# Patient Record
Sex: FEMALE | Race: WHITE
[De-identification: names, ages, dates, MRNs, and addresses within clinical notes are randomized per-mention and may not be internally consistent; named-entity substitution may affect disease eponyms.]

---

## 2017-02-01 NOTE — DIAGNOSTIC IMAGING REPORT
PROCEDURE: CT urinary tract, rule out kidney stone.



TECHNIQUE: Multiple contiguous axial images were obtained

through the abdomen and pelvis without the use of intravenous

contrast.



INDICATION: Bilateral lower abdominal pain.



FINDINGS: The lung bases appear clear. The liver, spleen,

pancreas and adrenal glands appear unremarkable for an

unenhanced exam. Hypodensity near the gallbladder is probably an

artifact from adjacent duodenum with no definite calcified

stone.



The kidneys demonstrate no hydronephrosis. No urinary tract

stones. The abdominal aorta is normal in caliber. No para-aortic

significantly enlarged lymph nodes are seen. The uterus and

adnexa appear grossly unremarkable. There is no significant free

fluid or fluid collection in the abdomen or pelvis seen. There

is no bowel obstruction. Small to moderate amount of fecal

material is seen in the colon. The cecum projects into the right

side of the pelvis. The appendix is not seen. The osseous

structures appear grossly unremarkable.



IMPRESSION: No urinary tract stones or hydronephrosis.



Dictated by:



Dictated on workstation # [id number]

## 2017-02-01 NOTE — ED GU-FEMALE
General


Chief Complaint:  Back Problems


Stated Complaint:  POSSIBLE KIDNEY STONES


Nursing Triage Note:  


STATES YESTERDAY SHE HAD BLOOD IN HER URINE.  TODAY THINKS SHE PASSED A KIDNEY 


STONE WHILE IN THE SHOWER.  COMPLAINS OF SEVERE LOWER BACK PAIN.  TYLENOL 100MG 


TAKEN AT 1100.


Nursing Sepsis Screen:  No Definite Risk


Source:  patient


Exam Limitations:  no limitations





History of Present Illness


Time seen by provider:  15:50


Initial Comments


Here with report of pain to the left flank and blood in her urine.  She noted 

that she had a stone like object pass when she was urinating in the shower.  

She does complain of fever this morning and chills.  She did take Tylenol 1000 

mg at 11 a.m. and that seemed to help call now.  Pain has persisted.  She has 

nausea.  Reports pain is sharp and persistent to the left low back.


Timing/Duration:  getting worse


Severity/Quality:  moderate, severe, sharp


Location:  left flank


Radiation:  none


Activities at Onset:  none


Sexual Utting History:  not active (last 1 year ago.)


Modifying Factors:  Worsens With Urinating


Associated Symptoms:   abdominal pain dysuria fever/chills lower back pain 

nausea/vomiting urinary frequency





Allergies and Home Medications


Allergies


Coded Allergies:  


     No Known Drug Allergies (Unverified , 17)





Home Medications


  (Reported) 


Epinephrine 0.3 Mg/0.3 Ml Auto.injct 0.3 MG IJ (Reported) 





Constitutional:   see HPI chills feverNo weakness


EENTM:   no symptoms reported


Respiratory:   no symptoms reportedNo cough, No short of breath


Cardiovascular:   no symptoms reportedNo chest pain, No palpitations


Gastrointestinal:   abdominal pain nauseaNo vomiting


Genitourinary:   dysuria pain urgency


Musculoskeletal:   back painNo muscle cramps


Skin:   no symptoms reported


Psychiatric/Neurological:   No Symptoms Reported


Endocrine:   No Symptoms Reported


All Other Systemes Reviewed


Negative Unless Noted:  Yes





Past Medical-Social-Family Hx


Patient Social History


Alcohol Use:  Occasionally Uses


Recreational Drug Use:  No


Smoking Status:  Never a Smoker


Recent Foreign Travel:  No


Contact w/Someone Who Travel:  No


Recent Infectious Disease Expo:  No


Recent Hopitalizations:  No





Surgeries


HX Surgeries:  Yes (ESOPHOGEAL BX)


Surgeries:  Orthopedic





Respiratory


Hx Respiratory Disorders:  No





Cardiovascular


Hx Cardiac Disorders:  No





Neurological


Hx Neurological Disorders:  No





Reproductive System


Pregnant:  No


Hx Reproductive Disorders:  No





Genitourinary


Hx Genitourinary Disorders:  No





Gastrointestinal


Hx Gastrointestinal Disorders:  Yes (CELIAC DZ)





Musculoskeletal


Hx Musculoskeletal Disorders:  No





Endocrine


Hx Endocrine Disorders:  No





HEENT


HX ENT Disorders:  No





Cancer


Hx Cancer:  No





Reviewed Nursing Assessment


Reviewed/Agree w Nursing PMH:  Yes





Family Medical History


Significant Family History:  No Pertinent Family Hx





Physical Exam


Vital Signs





 Vital Sign - Last 12Hours








 17





 15:39


 


Temp 97.6


 


Pulse 86


 


Resp 18


 


B/P 114/82


 


Pulse Ox 98





Capillary Refill : Less Than 3 Seconds


General Appearance:   WD/WN no apparent distress


HEENT:   PERRL/EOMI pharynx normal


Neck:   full range of motion supple


Cardiovascular:   regular rate, rhythm no murmur


Respiratory:   lungs clear normal breath sounds


Gastrointestinal:   soft tenderness (mild suprapubic)


Back:   normal inspection no vertebral tendernessNo CVA tenderness (R),  CVA 

tenderness (L)


Extremities:   non-tender normal inspection


Neurologic/Psychiatric:   alert oriented x 3


Skin:   normal color warm/dry





Progress/Results/Core Measures


Results/Orders


Lab Results








 Laboratory Tests








Test


  17


16:04 17


16:10 Range/Units


 


 


Alanine Aminotransferase


(ALT/SGPT) 19 


  


  0-55  U/L


 


 


Albumin 4.7 H  3.2-4.5  G/DL


 


Alkaline Phosphatase 63     U/L


 


Anion Gap 9   5-14  MMOL/L


 


Aspartate Amino Transf


(AST/SGOT) 17 


  


  5-34  U/L


 


 


BUN/Creatinine Ratio 12    


 


Basophils # (Auto)


  0.0 


  


  0.0-0.1


10^3/uL


 


Basophils (%) (Auto) 0   0-10  %


 


Blood Urea Nitrogen 10   7-18  MG/DL


 


C-Reactive Protein High


Sensitivity 0.97 H


  


  0.00-0.50


MG/DL


 


Calcium Level 9.4   8.5-10.1  MG/DL


 


Carbon Dioxide Level 22   21-32  MMOL/L


 


Chloride Level 108 H    MMOL/L


 


Creatinine


  0.81 


  


  0.60-1.30


MG/DL


 


Eosinophils # (Auto)


  0.1 


  


  0.0-0.3


10^3/uL


 


Eosinophils (%) (Auto) 1   0-10  %


 


Estimat Glomerular Filtration


Rate > 60 


  


   


 


 


Glucose Level 84     MG/DL


 


Hematocrit 46   35-52  %


 


Hemoglobin 15.7   11.5-16.0  G/DL


 


Lymphocytes # (Auto) 2.4   1.0-4.0  X 10^3


 


Lymphocytes (%) (Auto) 27   12-44  %


 


Mean Corpuscular Hemoglobin 31   25-34  PG


 


Mean Corpuscular Hemoglobin


Concent 34 


  


  32-36  G/DL


 


 


Mean Corpuscular Volume 91   80-99  FL


 


Mean Platelet Volume 11.7 H  7.4-10.4  FL


 


Monocytes # (Auto) 0.8   0.0-1.0  X 10^3


 


Monocytes (%) (Auto) 10   0-12  %


 


Neutrophils # (Auto) 5.5   1.8-7.8  X 10^3


 


Neutrophils (%) (Auto) 62   42-75  %


 


Platelet Count


  266 


  


  130-400


10^3/uL


 


Potassium Level 4.1   3.6-5.0  MMOL/L


 


Red Blood Count


  5.05 


  


  4.35-5.85


10^6/uL


 


Red Cell Distribution Width 13.0   10.0-14.5  %


 


Sodium Level 139   135-145  MMOL/L


 


Total Bilirubin 0.3   0.1-1.0  MG/DL


 


Total Protein 7.6   6.4-8.2  G/DL


 


White Blood Count


  8.8 


  


  4.3-11.0


10^3/uL


 


Urine Bacteria  NONE   /HPF


 


Urine Bilirubin  NEGATIVE  NEGATIVE  


 


Urine Casts  NONE   /LPF


 


Urine Clarity


  


  SLIGHTLY


CLOUDY  


 


 


Urine Color  YELLOW   


 


Urine Crystals  NONE   /LPF


 


Urine Culture Indicated  NO   


 


Urine Glucose (UA)  NEGATIVE  NEGATIVE  


 


Urine Ketones  NEGATIVE  NEGATIVE  


 


Urine Leukocyte Esterase  NEGATIVE  NEGATIVE  


 


Urine Mucus  SMALL H  /LPF


 


Urine Nitrite  NEGATIVE  NEGATIVE  


 


Urine Protein  NEGATIVE  NEGATIVE  


 


Urine RBC  5-10 H  /HPF


 


Urine RBC (Auto)  2+ H NEGATIVE  


 


Urine Specific Gravity  1.015 L 1.016-1.022  


 


Urine Squamous Epithelial


Cells 


  0-2 


   /HPF


 


 


Urine Urobilinogen  NORMAL  NORMAL  MG/DL


 


Urine WBC  NONE   /HPF


 


Urine pH  6  5-9  











My Orders





 Orders-BERTHA JONES MD


Ua Culture If Indicated (17 15:40)


Cbc With Automated Diff (17 15:59)


Comprehensive Metabolic Panel (17 15:59)


Hs C Reactive Protein (17 15:59)


Ondansetron Injection (Zofran Injectio (17 16:00)


Ns Iv 1000 Ml (Sodium Chloride 0.9%) (17 15:59)


Saline Lock/Iv-Start (17 15:59)


Fentanyl  Injection (Sublimaze Injection (17 15:59)


Ketorolac Injection (Toradol Injection) (17 15:59)


Ct Abd/Pelvis Wo(Kidney Stone) (17 16:07)





Medications Given in ED








 Current Medications








 Medications  Dose


 Ordered  Sig/Jose A


 Route  Start Time


 Stop Time Status Last Admin


Dose Admin


 


 Ondansetron HCl  4 mg  ONCE  ONCE


 IVP  17 16:00


 17 16:01 DC 17 16:16


4 MG











Vital Signs/I&O





 Vital Sign - Last 12Hours








 17





 15:39


 


Temp 97.6


 


Pulse 86


 


Resp 18


 


B/P 114/82


 


Pulse Ox 98








Blood Pressure Mean:  93


Progress Note :  


Progress Note


Seen and evaluated.  IV, labs, UA, normal saline 1 L bolus, fentanyl 50 g IV, 

Toradol 30 mg IV and Zofran 4 mg IV ordered.  CT abdomen and pelvis kidney 

stone protocol ordered.  Monitor patient.  Patient states that she has not been 

sexually active for one year and is agreeable to signing waiver.  1750: Overall 

improved.  No significant findings except for trace blood in the urine.  Likely 

patient passed stone earlier.  We will treat outpatient with antibiotics and 

have her follow-up as needed.  Discharged home with return precautions.  

Patient verbalize understanding instructions and agreement with plan.





Diagnostic Imaging





   Diagonstic Imaging:  CT


   Plain Films/CT/US/NM/MRI:  abdomen, pelvis


Comments


 VIA [facility], [facility].


 [place], [place]





NAME:   RANJANALIZZIE SHAJI


MED REC#:   [id number]


ACCOUNT#:   [id number]


PT STATUS:   REG ER


:   1995


PHYSICIAN:   BERTHA JONES MD


ADMIT DATE:   17/ER


 ***Draft***


Date of Exam:17





CT ABD/PELVIS WO(KIDNEY STONE)














PROCEDURE: CT urinary tract, rule out kidney stone.





TECHNIQUE: Multiple contiguous axial images were obtained


through the abdomen and pelvis without the use of intravenous


contrast.





INDICATION: Bilateral lower abdominal pain.





FINDINGS: The lung bases appear clear. The liver, spleen,


pancreas and adrenal glands appear unremarkable for an


unenhanced exam. Hypodensity near the gallbladder is probably an


artifact from adjacent duodenum with no definite calcified


stone.





The kidneys demonstrate no hydronephrosis. No urinary tract


stones. The abdominal aorta is normal in caliber. No para-aortic


significantly enlarged lymph nodes are seen. The uterus and


adnexa appear grossly unremarkable. There is no significant free


fluid or fluid collection in the abdomen or pelvis seen. There


is no bowel obstruction. Small to moderate amount of fecal


material is seen in the colon. The cecum projects into the right


side of the pelvis. The appendix is not seen. The osseous


structures appear grossly unremarkable.





IMPRESSION: No urinary tract stones or hydronephrosis.





Dictated on workstation # [id number]








Dict:   17 1636


Trans:   17 1650


PJE [id number]





Interpreted by:     KRISTINA DELGADILLO MD


Electronically signed by:





Departure


Impression


Impression:  


 Primary Impression:  


 Kidney stone on left side


Disposition:   HOME, SELF-CARE


Condition:  Improved





Departure-Patient Inst.


Decision time for Depature:  17:50


Referrals:  


NO,LOCAL PHYSICIAN (PCP/Family)


Primary Care Physician


Patient Instructions:  Kidney Stones in Adults





Add. Discharge Instructions:


All discharge instructions reviewed with patient and/or family. Voiced 

understanding.





Take medications as directed.  Follow-up with your Dr. in a few days for 

recheck.  Return for worse pain, fever, vomiting, weakness, breathing problems 

or other concerns as needed.  Drink plenty of fluids.  He may take ibuprofen 

800 mg every 8 hours as needed for pain.  You may take Tylenol 1000 mg every 8 

hours as needed for pain but do not take Tylenol with the hydrocodone 

containing medication as they both have Tylenol in it.


Scripts


Cephalexin 500 Mg [id number] Mg PO BID #10 TAB  Ref 0


   Prov:BERTHA JONES MD         17


Hydrocodone/Acetaminophen (Hydrocodon -Acetaminophen 5-325)1 Each Tablet1 Each 

PO Q6H PRN PAIN #5 TAB  Ref 0


   Prov:BERTHA JONES MD         17








BERTHA JONES MD 2017 16:07

## 2018-03-09 ENCOUNTER — HOSPITAL ENCOUNTER (OUTPATIENT)
Dept: HOSPITAL 75 - RAD | Age: 23
End: 2018-03-09
Attending: FAMILY MEDICINE
Payer: COMMERCIAL

## 2018-03-09 DIAGNOSIS — R10.9: ICD-10-CM

## 2018-03-09 DIAGNOSIS — R19.7: ICD-10-CM

## 2018-03-09 DIAGNOSIS — R16.0: Primary | ICD-10-CM

## 2018-03-09 PROCEDURE — 76700 US EXAM ABDOM COMPLETE: CPT

## 2018-03-09 NOTE — DIAGNOSTIC IMAGING REPORT
PROCEDURE: US abdomen complete.



TECHNIQUE: Multiple real-time grayscale images were obtained over

the abdomen in various projections.



INDICATION: Abdominal pain and diarrhea.



The pancreas is unremarkable. The visualized aorta is

non-aneurysmal. The IVC is unremarkable. The liver is mildly

enlarged at 19 cm. No discrete liver mass is identified. The

portal vein is patent and shows normal direction of flow. The

gallbladder is unremarkable. No stones or sludge are identified.

There is no gallbladder wall thickening. No pericholecystic fluid

or biliary ductal dilatation is identified. The extrahepatic bile

duct is 4 mm. The spleen is normal in size at 10.1 cm. The right

and left kidneys are unremarkable. There is no ascites.



IMPRESSION: Mild hepatomegaly. The study is otherwise

unremarkable.



Dictated by: 



  Dictated on workstation # [id number]